# Patient Record
Sex: FEMALE | Race: WHITE | NOT HISPANIC OR LATINO | Employment: FULL TIME | URBAN - METROPOLITAN AREA
[De-identification: names, ages, dates, MRNs, and addresses within clinical notes are randomized per-mention and may not be internally consistent; named-entity substitution may affect disease eponyms.]

---

## 2018-04-11 ENCOUNTER — APPOINTMENT (OUTPATIENT)
Dept: WOMENS IMAGING | Facility: HOSPITAL | Age: 53
End: 2018-04-11

## 2018-04-11 PROCEDURE — 77063 BREAST TOMOSYNTHESIS BI: CPT | Performed by: RADIOLOGY

## 2018-04-11 PROCEDURE — 77067 SCR MAMMO BI INCL CAD: CPT | Performed by: RADIOLOGY

## 2019-02-08 ENCOUNTER — APPOINTMENT (OUTPATIENT)
Dept: WOMENS IMAGING | Facility: HOSPITAL | Age: 54
End: 2019-02-08

## 2019-02-08 PROCEDURE — 77065 DX MAMMO INCL CAD UNI: CPT | Performed by: RADIOLOGY

## 2019-02-08 PROCEDURE — 76641 ULTRASOUND BREAST COMPLETE: CPT | Performed by: RADIOLOGY

## 2019-02-08 PROCEDURE — 77061 BREAST TOMOSYNTHESIS UNI: CPT | Performed by: RADIOLOGY

## 2019-02-08 PROCEDURE — G0279 TOMOSYNTHESIS, MAMMO: HCPCS | Performed by: RADIOLOGY

## 2019-07-17 ENCOUNTER — APPOINTMENT (OUTPATIENT)
Dept: WOMENS IMAGING | Facility: HOSPITAL | Age: 54
End: 2019-07-17

## 2019-07-17 PROCEDURE — 77067 SCR MAMMO BI INCL CAD: CPT | Performed by: RADIOLOGY

## 2019-07-17 PROCEDURE — 77063 BREAST TOMOSYNTHESIS BI: CPT | Performed by: RADIOLOGY

## 2022-08-16 ENCOUNTER — OFFICE VISIT (OUTPATIENT)
Dept: OBSTETRICS AND GYNECOLOGY | Age: 57
End: 2022-08-16

## 2022-08-16 VITALS
SYSTOLIC BLOOD PRESSURE: 128 MMHG | BODY MASS INDEX: 28.06 KG/M2 | DIASTOLIC BLOOD PRESSURE: 74 MMHG | WEIGHT: 178.8 LBS | HEIGHT: 67 IN

## 2022-08-16 DIAGNOSIS — Z12.31 ENCOUNTER FOR SCREENING MAMMOGRAM FOR MALIGNANT NEOPLASM OF BREAST: ICD-10-CM

## 2022-08-16 DIAGNOSIS — Z20.2 POSSIBLE EXPOSURE TO STD: ICD-10-CM

## 2022-08-16 PROCEDURE — 99386 PREV VISIT NEW AGE 40-64: CPT | Performed by: OBSTETRICS & GYNECOLOGY

## 2022-08-16 RX ORDER — HYDROCHLOROTHIAZIDE 12.5 MG/1
12.5 CAPSULE, GELATIN COATED ORAL DAILY
COMMUNITY
Start: 2022-08-10

## 2022-08-16 NOTE — PROGRESS NOTES
Routine Annual Visit    2022    Patient: Emi Turner          MR#:[id number]      Chief Complaint   Patient presents with   • Gynecologic Exam     New pt, Last AE 2021. Pt c/o of pain on right side that comes and goes, started about a month ago.        History of Present Illness    56 y.o. female  who presents for annual exam she also complains of intermittent right lower quadrant pain.  She went to [place] [place] last month and walked excessively. Has hx of right SI joint pain, and symptoms it seems to radiate from that area.    [facility] but she moved  Mammogram last October  No issues with SA, no urinary complaints      No LMP recorded. Patient has had a hysterectomy.  Obstetric History:  OB History        2    Para   2    Term   2       0    AB   0    Living   2       SAB   0    IAB   0    Ectopic   0    Molar        Multiple   0    Live Births                   Menstrual History:     No LMP recorded. Patient has had a hysterectomy.       ________________________________________  There is no problem list on file for this patient.      Past Medical History:   Diagnosis Date   • Abnormal Pap smear of cervix 10/15/2004    ASCUS-Neg for HPV   • Hypertension        Family History   Problem Relation Age of Onset   • Breast cancer Mother 45   • Hypertension Mother    • Hypertension Father    • Hypertension Brother    • Cancer Maternal Aunt    • Diabetes Maternal Grandmother    • Cancer Maternal Grandmother        Past Surgical History:   Procedure Laterality Date   • TOTAL ABDOMINAL HYSTERECTOMY      fibroids FOUZIA Mendieta   • TUBAL ABDOMINAL LIGATION  2002       Social History     Tobacco Use   Smoking Status Never Smoker   Smokeless Tobacco Not on file       has a current medication list which includes the following prescription(s): hydrochlorothiazide.  ________________________________________      Review of Systems   Constitutional:  "Negative for fever and unexpected weight change.   Respiratory: Negative for shortness of breath.    Cardiovascular: Negative for chest pain.   Gastrointestinal: Positive for abdominal pain. Negative for constipation and diarrhea.   Genitourinary: Negative for frequency and urgency.   Musculoskeletal: Positive for back pain.   Hematological: Negative for adenopathy.   Psychiatric/Behavioral: Negative for dysphoric mood.       Objective   Physical Exam    /74   Ht 170.2 cm (67\")   Wt 81.1 kg (178 lb 12.8 oz)   Breastfeeding No   BMI 28.00 kg/m²    BP Readings from Last 3 Encounters:   08/16/22 128/74      Wt Readings from Last 3 Encounters:   08/16/22 81.1 kg (178 lb 12.8 oz)         BMI: Body mass index is 28 kg/m².       General:   alert, appears stated age and cooperative   Neck: No thyromegaly or LAD   Heart:: regular rate and rhythm, S1, S2 normal, no murmur, click, rub or gallop   Lungs: normal respiratory effort and auscultation   Abdomen: soft, non-tender, without masses or organomegaly and no sig TTP in RLQ but points to area just medial to the ASIS   Breast: inspection negative, no nipple discharge or bleeding, no masses or nodularity palpable   Urethra and bladder: urethral meatus normal; bladder nontender to palpation;   Vulva: normal, Bartholin's, Urethra, Vander's normal   Vagina: normal mucosa, normal discharge   Cervix: absent   Uterus: absent    Adnexa: normal adnexa and no mass, fullness, tenderness       Assessment:    normal annual exam   RLQ abd pain    Plan:    Plan     [x]  Mammogram request made  []  PAP done  []  Labs:   []  GC/Chl/TV  []  DEXA scan   []  Referral for colonoscopy:     Plan to return for GYN ultrasound to evaluate her ovary, but I think this is not the likely cause of her pain.  It seems more likely to be musculoskeletal in origin or myofascial of her abdominal wall.    Counseling  [x]  Nutrition  [x]  Physical activity/regular exercise   [x]  Healthy weight  []  " Injury prevention  []  Smoking cessation  []  Substance misuse/abuse  [x]  Sexual behavior  []  STD prevention  []  Contraception  []  Dental health  []  Mental health  []  Immunization  [x]  Encouraged SBE      Fernanda Lopez MD  08/16/2022  09:24 EDT

## 2022-08-18 LAB
C TRACH RRNA SPEC QL NAA+PROBE: NEGATIVE
N GONORRHOEA RRNA SPEC QL NAA+PROBE: NEGATIVE
T VAGINALIS RRNA SPEC QL NAA+PROBE: NEGATIVE

## 2022-08-23 ENCOUNTER — OFFICE VISIT (OUTPATIENT)
Dept: OBSTETRICS AND GYNECOLOGY | Age: 57
End: 2022-08-23

## 2022-08-23 VITALS
HEIGHT: 67 IN | WEIGHT: 176.6 LBS | SYSTOLIC BLOOD PRESSURE: 120 MMHG | BODY MASS INDEX: 27.72 KG/M2 | DIASTOLIC BLOOD PRESSURE: 68 MMHG

## 2022-08-23 DIAGNOSIS — R10.31 RLQ ABDOMINAL PAIN: Primary | ICD-10-CM

## 2022-08-23 PROCEDURE — 99213 OFFICE O/P EST LOW 20 MIN: CPT | Performed by: OBSTETRICS & GYNECOLOGY

## 2022-08-23 RX ORDER — MULTIPLE VITAMINS W/ MINERALS TAB 9MG-400MCG
TAB ORAL
COMMUNITY

## 2022-08-23 NOTE — PROGRESS NOTES
Chief Complaint   Patient presents with   • Gynecologic Exam     Follow up right side pain with US      Emi Turner presents to follow up regarding intermittent RLQ abd pain. She returns today for sonogram. When I saw her last she had been having it on and off for about a month. Is a sharp pain sometimes worse with activity.   She has a hx of hysterectomy due to AUB With fibroids    Diagnoses and all orders for this visit:    1. RLQ abdominal pain (Primary)        Reviewed sonogram findings and she has normal appearing bilateral ovaries without mass, they are small and menopausal appearing. No pelvic free fluid  She has a c scope coming up next month and she plans to follow up with her pcp as well regarding this pain    Fernanda Lopez MD  8/23/2022  11:56 EDT

## 2023-09-05 ENCOUNTER — OFFICE VISIT (OUTPATIENT)
Dept: OBSTETRICS AND GYNECOLOGY | Age: 58
End: 2023-09-05
Payer: MEDICAID

## 2023-09-05 VITALS
HEIGHT: 67 IN | WEIGHT: 176.2 LBS | BODY MASS INDEX: 27.65 KG/M2 | SYSTOLIC BLOOD PRESSURE: 128 MMHG | DIASTOLIC BLOOD PRESSURE: 74 MMHG

## 2023-09-05 DIAGNOSIS — Z12.31 ENCOUNTER FOR SCREENING MAMMOGRAM FOR MALIGNANT NEOPLASM OF BREAST: ICD-10-CM

## 2023-09-05 DIAGNOSIS — N95.2 VAGINAL ATROPHY: ICD-10-CM

## 2023-09-05 DIAGNOSIS — Z01.419 WELL WOMAN EXAM WITH ROUTINE GYNECOLOGICAL EXAM: Primary | ICD-10-CM

## 2023-09-05 DIAGNOSIS — Z11.3 SCREENING FOR STD (SEXUALLY TRANSMITTED DISEASE): ICD-10-CM

## 2023-09-05 RX ORDER — ESTRADIOL 0.1 MG/G
CREAM VAGINAL
Qty: 42.5 G | Refills: 12 | Status: SHIPPED | OUTPATIENT
Start: 2023-09-05

## 2023-09-05 NOTE — PROGRESS NOTES
"Routine Annual Visit    2023    Patient: Emi Turner          MR#:[id number]      Chief Complaint   Patient presents with    Annual Exam     AE today, Last AE 2022, MG 2022 nml, Colonoscopy  polyp, Hx Hysterectomy , c/o vaginal dryness and possible \"bladder falling\"       History of Present Illness    57 y.o. female  who presents for annual exam. She wonders if she may have some bladder prolapse, was told she had some but was mild by prior GYN. No urinary complaints, leakage, or pelvic pressure or protrusion  Does note vaginal dryness    Famhx of breast cancer, colon ca but neg genetic testing    No LMP recorded. Patient has had a hysterectomy.  Obstetric History:  OB History          2    Para   2    Term   2       0    AB   0    Living   2         SAB   0    IAB   0    Ectopic   0    Molar        Multiple   0    Live Births   2               Menstrual History:     No LMP recorded. Patient has had a hysterectomy.       ________________________________________  There is no problem list on file for this patient.      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       Family History   Problem Relation Age of Onset    Hypertension Father     Leukemia Father     Breast cancer Mother 45    Hypertension Mother     Hypertension Brother     Colon cancer Maternal Grandmother     Diabetes Maternal Grandmother     Cancer Maternal Grandmother     Heart attack Maternal Grandmother     Colon cancer Maternal Aunt     Cancer Maternal Aunt     Ovarian cancer Neg Hx     Uterine cancer Neg Hx        Past Surgical History:   Procedure Laterality Date    BUNIONECTOMY      SHOULDER SURGERY Right 2018    Cyst on right shoulder    TOTAL ABDOMINAL HYSTERECTOMY      fibroids AUB Gayatri - Has both ovaries    TUBAL ABDOMINAL LIGATION  2002       Social History     Tobacco Use   Smoking Status Never    Passive " "exposure: Never   Smokeless Tobacco Not on file       has a current medication list which includes the following prescription(s): calcium carb-cholecalciferol, hydrochlorothiazide, multivitamin with minerals, and estradiol.  ________________________________________      Review of Systems   Constitutional:  Negative for fever and unexpected weight change.   Respiratory:  Negative for shortness of breath.    Cardiovascular:  Negative for chest pain.   Gastrointestinal:  Negative for abdominal pain, constipation and diarrhea.   Genitourinary:  Negative for frequency and urgency.   Hematological:  Negative for adenopathy.   Psychiatric/Behavioral:  Negative for dysphoric mood.      Objective   Physical Exam    /74   Ht 170.2 cm (67\")   Wt 79.9 kg (176 lb 3.2 oz)   Breastfeeding No   BMI 27.60 kg/m²    BP Readings from Last 3 Encounters:   09/05/23 128/74   08/23/22 120/68   08/16/22 128/74      Wt Readings from Last 3 Encounters:   09/05/23 79.9 kg (176 lb 3.2 oz)   08/23/22 80.1 kg (176 lb 9.6 oz)   08/16/22 81.1 kg (178 lb 12.8 oz)         BMI: Body mass index is 27.6 kg/m².       General:   alert, appears stated age, and cooperative   Neck: No thyromegaly or LAD   Abdomen: soft, non-tender, without masses or organomegaly   Breast: inspection negative, no nipple discharge or bleeding, no masses or nodularity palpable   Urethra and bladder: urethral meatus normal; bladder nontender to palpation; grade 1 cystocele, not signficant   Vulva: normal, Bartholin's, Urethra, Whiteside's normal   Vagina: Normal but mildly atrophic mucosa   Cervix: absent   Uterus: absent    Adnexa: normal adnexa and no mass, fullness, tenderness       Assessment:    normal annual exam   Vaginal atrophy  menopause    Plan:    Plan     [x]  Mammogram request made  []  PAP done  []  Labs:   []  GC/Chl/TV  []  DEXA scan   []  Referral for colonoscopy:     Sent in estrace cream, discussed is safe even with famhx breast cancer as serum levels " not appreciably increased with topical vaginal estrogen  Does not appear to have much of a cystocele    Counseling  [x]  Nutrition  [x]  Physical activity/regular exercise   [x]  Healthy weight  []  Injury prevention  []  Smoking cessation  []  Substance misuse/abuse  [x]  Sexual behavior  []  STD prevention  []  Contraception  []  Dental health  []  Mental health  []  Immunization  [x]  Encouraged SBE        Fernanda Lopez MD  09/05/2023  12:49 EDT

## 2023-09-07 NOTE — PROGRESS NOTES
Please notify that screening for gonorrhea, chlamydia, trichomonas was negative    Fernanda Lopez MD  9/7/2023  12:51 EDT